# Patient Record
Sex: MALE | Race: WHITE | NOT HISPANIC OR LATINO | Employment: FULL TIME | URBAN - METROPOLITAN AREA
[De-identification: names, ages, dates, MRNs, and addresses within clinical notes are randomized per-mention and may not be internally consistent; named-entity substitution may affect disease eponyms.]

---

## 2023-09-06 ENCOUNTER — PREP FOR SURGERY (OUTPATIENT)
Dept: OTHER | Facility: HOSPITAL | Age: 48
End: 2023-09-06
Payer: COMMERCIAL

## 2023-09-06 ENCOUNTER — OFFICE VISIT (OUTPATIENT)
Dept: ORTHOPEDIC SURGERY | Facility: CLINIC | Age: 48
End: 2023-09-06
Payer: COMMERCIAL

## 2023-09-06 VITALS
HEIGHT: 72 IN | BODY MASS INDEX: 25.49 KG/M2 | WEIGHT: 188.2 LBS | DIASTOLIC BLOOD PRESSURE: 69 MMHG | SYSTOLIC BLOOD PRESSURE: 101 MMHG

## 2023-09-06 DIAGNOSIS — M1A.0790 CHRONIC GOUT OF FOOT, UNSPECIFIED CAUSE, UNSPECIFIED LATERALITY: ICD-10-CM

## 2023-09-06 DIAGNOSIS — M79.671 RIGHT FOOT PAIN: Primary | ICD-10-CM

## 2023-09-06 DIAGNOSIS — R22.42 MASS OF LEFT FOOT: Primary | ICD-10-CM

## 2023-09-06 DIAGNOSIS — M79.672 LEFT FOOT PAIN: ICD-10-CM

## 2023-09-06 RX ORDER — NICOTINE POLACRILEX 4 MG/1
GUM, CHEWING ORAL
COMMUNITY
Start: 2023-08-04

## 2023-09-06 RX ORDER — FLUOXETINE HYDROCHLORIDE 40 MG/1
CAPSULE ORAL DAILY
COMMUNITY
Start: 2023-08-04

## 2023-09-06 RX ORDER — NAPROXEN 500 MG/1
TABLET ORAL
COMMUNITY
Start: 2023-05-25

## 2023-09-06 RX ORDER — ALLOPURINOL 200 MG/1
TABLET ORAL
COMMUNITY
Start: 2023-08-04

## 2023-09-06 RX ORDER — CEFAZOLIN SODIUM 2 G/100ML
2 INJECTION, SOLUTION INTRAVENOUS ONCE
OUTPATIENT
Start: 2023-09-06 | End: 2023-09-06

## 2023-09-06 RX ORDER — LISINOPRIL 20 MG/1
1 TABLET ORAL DAILY
COMMUNITY
Start: 2023-08-04

## 2023-09-06 RX ORDER — ATORVASTATIN CALCIUM 20 MG/1
TABLET, FILM COATED ORAL
COMMUNITY
Start: 2023-08-04

## 2023-09-06 RX ORDER — COLCHICINE 0.6 MG/1
TABLET ORAL
COMMUNITY
Start: 2023-08-04

## 2023-09-06 NOTE — PROGRESS NOTES
[facility]     Office Visit       Date: 09/06/2023   Patient Name: Lance Fischer  MRN: [id number]  YOB: 1975    Referring Physician: Phillip Castillo, *     Chief Complaint:   Chief Complaint   Patient presents with   • Right Foot - Pain, Initial Evaluation     Great Toe, hx of gout   • Left Foot - Pain, Initial Evaluation       History of Present Illness: Lance Fischer is a 48 y.o. male who is here today with bilateral foot pain.  Pain with history of chronic gout being treated long-term with allopurinol.  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  Has had recurrent gouty episodes in the right great toe on and off for the past 10 to 12 years.  Previous surgery on the right distal medial great toe for removal of gouty tophi in the past.  Naproxen helps his condition.  Worse with shoes and exercise.  Works as a .  Denies history of diabetes.  Denies smoking.    Subjective   Review of Systems: Review of Systems   Constitutional:  Negative for chills, fever, unexpected weight gain and unexpected weight loss.   HENT:  Negative for congestion, postnasal drip and rhinorrhea.    Eyes:  Negative for blurred vision.   Respiratory:  Negative for shortness of breath.    Cardiovascular:  Negative for leg swelling.   Gastrointestinal:  Negative for abdominal pain, nausea and vomiting.   Genitourinary:  Negative for difficulty urinating.   Musculoskeletal:  Positive for arthralgias. Negative for gait problem, joint swelling and myalgias.   Skin:  Negative for skin lesions and wound.   Neurological:  Negative for dizziness, weakness, light-headedness and numbness.   Hematological:  Does not bruise/bleed easily.   Psychiatric/Behavioral:  Negative for depressed mood.    All other systems reviewed and are negative.     Past Medical History: No past medical history on file.    Past Surgical  "History:   Past Surgical History:   Procedure Laterality Date   • FOOT SURGERY  04/14/2021    Removed Tophi       Family History:   Family History   Problem Relation Age of Onset   • Fibromyalgia Mother    • Rheumatologic disease Mother        Social History:   Social History     Socioeconomic History   • Marital status:    Tobacco Use   • Smoking status: Former     Types: Cigarettes   • Tobacco comments:     Quit 15-20 y/a   Substance and Sexual Activity   • Alcohol use: Yes     Alcohol/week: 10.0 standard drinks     Types: 10 Cans of beer per week     Comment: 2 per day   • Drug use: Never   • Sexual activity: Yes     Partners: Male     Birth control/protection: Vasectomy       Medications:   Current Outpatient Medications:   •  Allopurinol 200 MG tablet, Take 1 tablet every day by oral route for 90 days., Disp: , Rfl:   •  atorvastatin (LIPITOR) 20 MG tablet, 1 po qhs, Disp: , Rfl:   •  colchicine 0.6 MG tablet, 2 po times 1 and 1 po 1 hour later prn, Disp: , Rfl:   •  FLUoxetine (PROzac) 40 MG capsule, Take  by mouth Daily., Disp: , Rfl:   •  lisinopril (PRINIVIL,ZESTRIL) 20 MG tablet, Take 1 tablet by mouth Daily., Disp: , Rfl:   •  naproxen (NAPROSYN) 500 MG tablet, 1 po BID for 7 days while starting allopurinol, then prn, Disp: , Rfl:   •  Omeprazole 20 MG tablet delayed-release, One capsule by mouth daily, Disp: , Rfl:     Allergies:   Allergies   Allergen Reactions   • Amlodipine Anaphylaxis   • Penicillin G Unknown - Low Severity       I reviewed the patient's chief complaint, history of present illness, review of systems, past medical history, surgical history, family history, social history, medications and allergy list.     Objective    Vital Signs:   Vitals:    09/06/23 1505   BP: 101/69   Weight: 85.4 kg (188 lb 3.2 oz)   Height: 183.5 cm (72.25\")     Body mass index is 25.35 kg/m².    Ortho Exam:  bilateral LE Foot and Ankle Exam:   Normal gait pattern. Hindfoot alignment is neutral. " Plantigrade foot.   Neurological exam of the superficial peroneal, deep peroneal, plantar, sural and saphenous nerves demonstrates intact sensation and normal motor function.   Peripheral pulses including posterior tibial artery and deep peroneal artery are intact and palpable. There is good perfusion to the toes.   The skin is intact throughout the foot and ankle without ulceration.   Range of motion of ankle, subtalar joint, midfoot and toes is within normal limits.   Large gouty tophi palpable plantar aspect of the distal left foot beneath the third metatarsal about 2 cm x 2 cm with visualization of white tissue beneath the skin superficially consistent with gouty tophi, large gouty tophi about the right great toe with diffuse hypertrophy of the toe, well-healed surgical scar medial great toe    Results Review:   Imaging Results (Last 24 Hours)       Procedure Component Value Units Date/Time    XR Foot 3+ View Bilateral [[id number]] Resulted: 09/06/23 1648     Updated: 09/06/23 1650    Narrative:      Bilateral Foot X-Ray 09/06/23   Indication: Pain  Views: 3 weight bearing , comparison none  Findings: xrays reviewed by me today in the office and show no acute   osseous abnormality, chronic changes about the right great toe with cystic   formation in the distal phalanx as well as the distal aspect of the   proximal, no visible osseous abnormalities about the left foot on x-ray   phalanx with visible soft tissue swelling about the great toe              Assessment / Plan    Assessment/Plan:   Diagnoses and all orders for this visit:    1. Right foot pain (Primary)  -     XR Foot 3+ View Bilateral    2. Left foot pain  -     XR Foot 3+ View Bilateral    3. Chronic gout of foot, unspecified cause, unspecified laterality  -     Ambulatory Referral to Rheumatology      Discussed chronic gouty tophi which has been present for over a year causing pain that has progressed (a chronic illness with exacerbation). Decision  regarding surgical intervention considered.  Patient with large toe tophus plantar aspect of left foot has been present for years and is causing him discomfort with ambulation.  Patient has had previous tophi removed from right great toe which helped relieve the symptoms.  Patient has tried accommodative footwear but symptoms have persisted.  Plan be for removal of tophi at the plantar aspect of his left forefoot in the operating room.  Based on how that surgery goes we will likely plan for surgery on his right great toe secondary to his chronic gouty tophi present there as well.  We will start in the left foot due to it being more symptomatic at this time.    Surgery to be removal of the left foot soft tissue mass.    The risks and benefits of the procedure were discussed with the patient and or appropriate guardian, which include but are not limited to the risk of bleeding, infection, neurovascular damage, post-operative stiffness, tendon and/or ligament retears, recurrent instability, continued pain, arthritic pain, need for further revision surgeries in the future, deep venous thrombosis, and general risks from anesthesia. We also discussed the post-operative rehabilitation, the need for physical therapy, and the overall expected outcomes from the procedure. We also discussed the possible use of biologics including allograft. We allowed proper time and answered the patient's questions regarding the procedure. The patient expressed understanding. Knowing what the risks are and what the conservative treatment is, the patient elected to forgo any further conservative treatment options and proceed with the surgical intervention.    Goal for surgery at [facility] on September 21.       Follow Up:   Return in about 2 weeks (around 9/20/2023) for F/U after Surgery.      Jc Trimble MD  [facility]